# Patient Record
Sex: MALE | Race: WHITE | NOT HISPANIC OR LATINO | URBAN - METROPOLITAN AREA
[De-identification: names, ages, dates, MRNs, and addresses within clinical notes are randomized per-mention and may not be internally consistent; named-entity substitution may affect disease eponyms.]

---

## 2017-03-02 ENCOUNTER — NURSE TRIAGE (OUTPATIENT)
Dept: ADMINISTRATIVE | Facility: CLINIC | Age: 22
End: 2017-03-02

## 2017-03-02 NOTE — TELEPHONE ENCOUNTER
Reason for Disposition   [1] Caller is not with the adult (patient) AND [2] reporting urgent symptoms    Protocols used: ST INFORMATION ONLY CALL-A-AH    Mother called for patient and he is not with her- unable to complete triage